# Patient Record
Sex: FEMALE | Race: WHITE | NOT HISPANIC OR LATINO | Employment: UNEMPLOYED | URBAN - METROPOLITAN AREA
[De-identification: names, ages, dates, MRNs, and addresses within clinical notes are randomized per-mention and may not be internally consistent; named-entity substitution may affect disease eponyms.]

---

## 2020-06-16 ENCOUNTER — OFFICE VISIT (OUTPATIENT)
Dept: URGENT CARE | Facility: CLINIC | Age: 65
End: 2020-06-16

## 2020-06-16 VITALS
DIASTOLIC BLOOD PRESSURE: 78 MMHG | RESPIRATION RATE: 18 BRPM | BODY MASS INDEX: 35.19 KG/M2 | WEIGHT: 218 LBS | SYSTOLIC BLOOD PRESSURE: 136 MMHG | TEMPERATURE: 96.9 F | HEART RATE: 89 BPM | OXYGEN SATURATION: 98 %

## 2020-06-16 DIAGNOSIS — L03.031 PARONYCHIA OF GREAT TOE, RIGHT: ICD-10-CM

## 2020-06-16 PROCEDURE — 99203 OFFICE O/P NEW LOW 30 MIN: CPT | Performed by: PHYSICIAN ASSISTANT

## 2020-06-16 RX ORDER — CEPHALEXIN 500 MG/1
500 CAPSULE ORAL 4 TIMES DAILY
Qty: 20 CAP | Refills: 0 | Status: SHIPPED | OUTPATIENT
Start: 2020-06-16 | End: 2020-06-21

## 2020-06-16 ASSESSMENT — ENCOUNTER SYMPTOMS
FEVER: 0
NAUSEA: 0
CHILLS: 0
VOMITING: 0

## 2020-06-17 ASSESSMENT — ENCOUNTER SYMPTOMS
MYALGIAS: 0
BRUISES/BLEEDS EASILY: 0
SENSORY CHANGE: 0
FALLS: 0
TINGLING: 0
WEAKNESS: 0

## 2020-06-17 NOTE — PROGRESS NOTES
Subjective:      Mónica Carter is a 65 y.o. female who presents with Toe Pain (x4 days, stubbed toe and now it is swollen and painful )            HPI  65-year-old female presents urgent care with new problem of worsening redness and pain secondary to stubbing her right great toe 4 days ago.  She denies deformity.  Patient denies nausea, vomiting, or fevers.  No change in sensations.  Patient reports pain is worse with ambulation and putting on close toed shoes.  She has been taking OTC pain medication with some relief.  Denies other associated injuries. Denies other associated aggravating or alleviating factors.     Review of Systems   Constitutional: Negative for chills, fever and malaise/fatigue.   Gastrointestinal: Negative for nausea and vomiting.   Musculoskeletal: Negative for falls, joint pain and myalgias.        Right great toe pain, swelling, and redness   Neurological: Negative for tingling, sensory change and weakness.   Endo/Heme/Allergies: Does not bruise/bleed easily.     History reviewed. No pertinent past medical history.  Medications and allergies reviewed in [facility].  Social History     Tobacco Use   • Smoking status: Never Smoker   • Smokeless tobacco: Never Used   Substance Use Topics   • Alcohol use: Yes      Objective:     /78   Pulse 89   Temp 36.1 °C (96.9 °F) (Temporal)   Resp 18   Wt 98.9 kg (218 lb)   SpO2 98%   BMI 35.19 kg/m²      Physical Exam  Vitals signs reviewed.   Constitutional:       Appearance: She is well-developed.   HENT:      Head: Normocephalic and atraumatic.   Eyes:      Conjunctiva/sclera: Conjunctivae normal.   Neck:      Musculoskeletal: Normal range of motion and neck supple.   Cardiovascular:      Rate and Rhythm: Normal rate and regular rhythm.   Pulmonary:      Effort: Pulmonary effort is normal. No respiratory distress.   Abdominal:      Palpations: Abdomen is soft.   Musculoskeletal:         General: No deformity.        Feet:    Skin:     General:  Skin is warm.   Neurological:      General: No focal deficit present.      Mental Status: She is alert and oriented to person, place, and time.   Psychiatric:         Mood and Affect: Mood normal.         Behavior: Behavior normal.         Thought Content: Thought content normal.         Judgment: Judgment normal.            Procedure: I & D  Paronychia    Risks and benefits discussed at length  Topical numbing spray used as anesthetic  Sterile technique throughout.   18ga needle was advanced at the nail border until serous and bloody material expressed.   Topical antibiotic and dressing applied.  Patient tolerated the  procedure well.    Assessment/Plan:     1. Paronychia of great toe, right    - cephALEXin (KEFLEX) 500 MG Cap; Take 1 Cap by mouth 4 times a day for 5 days.  Dispense: 20 Cap; Refill: 0    Recommend OTC Tylenol/Motrin for pain and inflammation.  Patient may soak foot in Epsom salt baths with warm water up to 3 times per day.  Patient given contingent course of antibiotics for worsening symptoms of infection including increased pain, redness, streaking, discharge, or fevers.  Patient verbalized understanding of treatment plan and has no further questions regarding care.